# Patient Record
Sex: FEMALE | Race: WHITE | NOT HISPANIC OR LATINO | Employment: UNEMPLOYED | URBAN - NONMETROPOLITAN AREA
[De-identification: names, ages, dates, MRNs, and addresses within clinical notes are randomized per-mention and may not be internally consistent; named-entity substitution may affect disease eponyms.]

---

## 2022-10-01 ENCOUNTER — HOSPITAL ENCOUNTER (INPATIENT)
Facility: HOSPITAL | Age: 32
LOS: 1 days | Discharge: HOME | DRG: 263 | End: 2022-10-03
Attending: EMERGENCY MEDICINE | Admitting: FAMILY MEDICINE
Payer: COMMERCIAL

## 2022-10-01 DIAGNOSIS — K81.0 ACUTE CHOLECYSTITIS: ICD-10-CM

## 2022-10-01 DIAGNOSIS — R10.9 ACUTE ABDOMINAL PAIN: Primary | ICD-10-CM

## 2022-10-01 DIAGNOSIS — K80.00 CALCULUS OF GALLBLADDER WITH ACUTE CHOLECYSTITIS WITHOUT OBSTRUCTION: ICD-10-CM

## 2022-10-01 LAB
BASOPHILS # BLD AUTO: 0.05 THOUSANDS/ΜL (ref 0–0.1)
BASOPHILS NFR BLD AUTO: 1 % (ref 0–1)
D DIMER PPP FEU-MCNC: 0.54 UG/ML FEU
EOSINOPHIL # BLD AUTO: 0.02 THOUSAND/ΜL (ref 0–0.61)
EOSINOPHIL NFR BLD AUTO: 0 % (ref 0–6)
ERYTHROCYTE [DISTWIDTH] IN BLOOD BY AUTOMATED COUNT: 13.3 % (ref 11.6–15.1)
HCT VFR BLD AUTO: 41.3 % (ref 34.8–46.1)
HGB BLD-MCNC: 13.3 G/DL (ref 11.5–15.4)
IMM GRANULOCYTES # BLD AUTO: 0.04 THOUSAND/UL (ref 0–0.2)
IMM GRANULOCYTES NFR BLD AUTO: 0 % (ref 0–2)
LYMPHOCYTES # BLD AUTO: 2.44 THOUSANDS/ΜL (ref 0.6–4.47)
LYMPHOCYTES NFR BLD AUTO: 26 % (ref 14–44)
MCH RBC QN AUTO: 27.8 PG (ref 26.8–34.3)
MCHC RBC AUTO-ENTMCNC: 32.2 G/DL (ref 31.4–37.4)
MCV RBC AUTO: 86 FL (ref 82–98)
MONOCYTES # BLD AUTO: 0.62 THOUSAND/ΜL (ref 0.17–1.22)
MONOCYTES NFR BLD AUTO: 7 % (ref 4–12)
NEUTROPHILS # BLD AUTO: 6.07 THOUSANDS/ΜL (ref 1.85–7.62)
NEUTS SEG NFR BLD AUTO: 66 % (ref 43–75)
NRBC BLD AUTO-RTO: 0 /100 WBCS
PLATELET # BLD AUTO: 281 THOUSANDS/UL (ref 149–390)
PMV BLD AUTO: 10.2 FL (ref 8.9–12.7)
RBC # BLD AUTO: 4.78 MILLION/UL (ref 3.81–5.12)
WBC # BLD AUTO: 9.24 THOUSAND/UL (ref 4.31–10.16)

## 2022-10-01 PROCEDURE — 96375 TX/PRO/DX INJ NEW DRUG ADDON: CPT

## 2022-10-01 PROCEDURE — 85379 FIBRIN DEGRADATION QUANT: CPT | Performed by: PHYSICIAN ASSISTANT

## 2022-10-01 PROCEDURE — 36415 COLL VENOUS BLD VENIPUNCTURE: CPT | Performed by: PHYSICIAN ASSISTANT

## 2022-10-01 PROCEDURE — 96374 THER/PROPH/DIAG INJ IV PUSH: CPT

## 2022-10-01 PROCEDURE — 93005 ELECTROCARDIOGRAM TRACING: CPT

## 2022-10-01 PROCEDURE — 83690 ASSAY OF LIPASE: CPT | Performed by: PHYSICIAN ASSISTANT

## 2022-10-01 PROCEDURE — 85025 COMPLETE CBC W/AUTO DIFF WBC: CPT | Performed by: PHYSICIAN ASSISTANT

## 2022-10-01 PROCEDURE — 99285 EMERGENCY DEPT VISIT HI MDM: CPT

## 2022-10-01 PROCEDURE — 80053 COMPREHEN METABOLIC PANEL: CPT | Performed by: PHYSICIAN ASSISTANT

## 2022-10-01 PROCEDURE — 84484 ASSAY OF TROPONIN QUANT: CPT | Performed by: PHYSICIAN ASSISTANT

## 2022-10-01 PROCEDURE — 99284 EMERGENCY DEPT VISIT MOD MDM: CPT | Performed by: PHYSICIAN ASSISTANT

## 2022-10-01 RX ORDER — OMEPRAZOLE 40 MG/1
40 CAPSULE, DELAYED RELEASE ORAL DAILY
COMMUNITY

## 2022-10-01 RX ORDER — ALBUTEROL SULFATE 90 UG/1
2 AEROSOL, METERED RESPIRATORY (INHALATION) EVERY 6 HOURS PRN
COMMUNITY

## 2022-10-01 RX ORDER — KETOROLAC TROMETHAMINE 30 MG/ML
15 INJECTION, SOLUTION INTRAMUSCULAR; INTRAVENOUS ONCE
Status: COMPLETED | OUTPATIENT
Start: 2022-10-01 | End: 2022-10-01

## 2022-10-01 RX ORDER — SUMATRIPTAN 50 MG/1
50 TABLET, FILM COATED ORAL ONCE AS NEEDED
COMMUNITY

## 2022-10-01 RX ORDER — LORAZEPAM 2 MG/ML
1 INJECTION INTRAMUSCULAR ONCE
Status: COMPLETED | OUTPATIENT
Start: 2022-10-01 | End: 2022-10-01

## 2022-10-01 RX ORDER — ONDANSETRON 2 MG/ML
4 INJECTION INTRAMUSCULAR; INTRAVENOUS ONCE
Status: COMPLETED | OUTPATIENT
Start: 2022-10-01 | End: 2022-10-01

## 2022-10-01 RX ADMIN — KETOROLAC TROMETHAMINE 15 MG: 30 INJECTION, SOLUTION INTRAMUSCULAR at 23:33

## 2022-10-01 RX ADMIN — LORAZEPAM 1 MG: 2 INJECTION, SOLUTION INTRAMUSCULAR; INTRAVENOUS at 23:32

## 2022-10-01 RX ADMIN — ONDANSETRON 4 MG: 2 INJECTION INTRAMUSCULAR; INTRAVENOUS at 23:29

## 2022-10-02 ENCOUNTER — ANESTHESIA EVENT (INPATIENT)
Dept: PERIOP | Facility: HOSPITAL | Age: 32
DRG: 263 | End: 2022-10-02
Payer: COMMERCIAL

## 2022-10-02 ENCOUNTER — APPOINTMENT (EMERGENCY)
Dept: CT IMAGING | Facility: HOSPITAL | Age: 32
DRG: 263 | End: 2022-10-02
Payer: COMMERCIAL

## 2022-10-02 ENCOUNTER — ANESTHESIA (INPATIENT)
Dept: PERIOP | Facility: HOSPITAL | Age: 32
DRG: 263 | End: 2022-10-02
Payer: COMMERCIAL

## 2022-10-02 ENCOUNTER — APPOINTMENT (OUTPATIENT)
Dept: ULTRASOUND IMAGING | Facility: HOSPITAL | Age: 32
DRG: 263 | End: 2022-10-02
Payer: COMMERCIAL

## 2022-10-02 PROBLEM — E66.01 MORBID OBESITY WITH BMI OF 45.0-49.9, ADULT (HCC): Status: ACTIVE | Noted: 2022-10-02

## 2022-10-02 PROBLEM — Z72.0 TOBACCO ABUSE: Status: ACTIVE | Noted: 2022-10-02

## 2022-10-02 PROBLEM — K80.00 CALCULUS OF GALLBLADDER WITH ACUTE CHOLECYSTITIS WITHOUT OBSTRUCTION: Status: ACTIVE | Noted: 2022-10-02

## 2022-10-02 LAB
ALBUMIN SERPL BCP-MCNC: 3.7 G/DL (ref 3.5–5)
ALP SERPL-CCNC: 88 U/L (ref 46–116)
ALT SERPL W P-5'-P-CCNC: 24 U/L (ref 12–78)
ANION GAP SERPL CALCULATED.3IONS-SCNC: 7 MMOL/L (ref 4–13)
APTT PPP: 28 SECONDS (ref 23–37)
AST SERPL W P-5'-P-CCNC: 16 U/L (ref 5–45)
ATRIAL RATE: 58 BPM
BILIRUB SERPL-MCNC: 0.27 MG/DL (ref 0.2–1)
BUN SERPL-MCNC: 17 MG/DL (ref 5–25)
CALCIUM SERPL-MCNC: 8.8 MG/DL (ref 8.3–10.1)
CARDIAC TROPONIN I PNL SERPL HS: <2 NG/L
CHLORIDE SERPL-SCNC: 103 MMOL/L (ref 96–108)
CO2 SERPL-SCNC: 28 MMOL/L (ref 21–32)
CREAT SERPL-MCNC: 1.14 MG/DL (ref 0.6–1.3)
EXT PREG TEST URINE: NEGATIVE
EXT. CONTROL ED NAV: NORMAL
GFR SERPL CREATININE-BSD FRML MDRD: 63 ML/MIN/1.73SQ M
GLUCOSE SERPL-MCNC: 112 MG/DL (ref 65–140)
INR PPP: 1.02 (ref 0.84–1.19)
LIPASE SERPL-CCNC: 170 U/L (ref 73–393)
P AXIS: 42 DEGREES
POTASSIUM SERPL-SCNC: 4 MMOL/L (ref 3.5–5.3)
PR INTERVAL: 168 MS
PROT SERPL-MCNC: 7.6 G/DL (ref 6.4–8.4)
PROTHROMBIN TIME: 13.5 SECONDS (ref 11.6–14.5)
QRS AXIS: 70 DEGREES
QRSD INTERVAL: 82 MS
QT INTERVAL: 442 MS
QTC INTERVAL: 433 MS
SODIUM SERPL-SCNC: 138 MMOL/L (ref 135–147)
T WAVE AXIS: 56 DEGREES
VENTRICULAR RATE: 58 BPM

## 2022-10-02 PROCEDURE — 88304 TISSUE EXAM BY PATHOLOGIST: CPT | Performed by: PATHOLOGY

## 2022-10-02 PROCEDURE — G1004 CDSM NDSC: HCPCS

## 2022-10-02 PROCEDURE — 81025 URINE PREGNANCY TEST: CPT | Performed by: EMERGENCY MEDICINE

## 2022-10-02 PROCEDURE — 85610 PROTHROMBIN TIME: CPT | Performed by: NURSE PRACTITIONER

## 2022-10-02 PROCEDURE — 85730 THROMBOPLASTIN TIME PARTIAL: CPT | Performed by: NURSE PRACTITIONER

## 2022-10-02 PROCEDURE — 47562 LAPAROSCOPIC CHOLECYSTECTOMY: CPT | Performed by: PHYSICIAN ASSISTANT

## 2022-10-02 PROCEDURE — 36415 COLL VENOUS BLD VENIPUNCTURE: CPT | Performed by: NURSE PRACTITIONER

## 2022-10-02 PROCEDURE — 99222 1ST HOSP IP/OBS MODERATE 55: CPT | Performed by: FAMILY MEDICINE

## 2022-10-02 PROCEDURE — 0FT44ZZ RESECTION OF GALLBLADDER, PERCUTANEOUS ENDOSCOPIC APPROACH: ICD-10-PCS | Performed by: SURGERY

## 2022-10-02 PROCEDURE — C9113 INJ PANTOPRAZOLE SODIUM, VIA: HCPCS | Performed by: NURSE PRACTITIONER

## 2022-10-02 PROCEDURE — 71275 CT ANGIOGRAPHY CHEST: CPT

## 2022-10-02 PROCEDURE — 74177 CT ABD & PELVIS W/CONTRAST: CPT

## 2022-10-02 PROCEDURE — 76705 ECHO EXAM OF ABDOMEN: CPT

## 2022-10-02 RX ORDER — ACETAMINOPHEN 325 MG/1
650 TABLET ORAL EVERY 6 HOURS PRN
Status: DISCONTINUED | OUTPATIENT
Start: 2022-10-02 | End: 2022-10-03 | Stop reason: HOSPADM

## 2022-10-02 RX ORDER — SODIUM CHLORIDE 9 MG/ML
100 INJECTION, SOLUTION INTRAVENOUS CONTINUOUS
Status: DISCONTINUED | OUTPATIENT
Start: 2022-10-02 | End: 2022-10-03

## 2022-10-02 RX ORDER — HYDROMORPHONE HCL IN WATER/PF 6 MG/30 ML
0.2 PATIENT CONTROLLED ANALGESIA SYRINGE INTRAVENOUS
Status: DISCONTINUED | OUTPATIENT
Start: 2022-10-02 | End: 2022-10-02

## 2022-10-02 RX ORDER — ALBUTEROL SULFATE 90 UG/1
2 AEROSOL, METERED RESPIRATORY (INHALATION) EVERY 6 HOURS PRN
Status: DISCONTINUED | OUTPATIENT
Start: 2022-10-02 | End: 2022-10-03 | Stop reason: HOSPADM

## 2022-10-02 RX ORDER — ROCURONIUM BROMIDE 10 MG/ML
INJECTION, SOLUTION INTRAVENOUS AS NEEDED
Status: DISCONTINUED | OUTPATIENT
Start: 2022-10-02 | End: 2022-10-02

## 2022-10-02 RX ORDER — PANTOPRAZOLE SODIUM 40 MG/10ML
40 INJECTION, POWDER, LYOPHILIZED, FOR SOLUTION INTRAVENOUS
Status: DISCONTINUED | OUTPATIENT
Start: 2022-10-02 | End: 2022-10-03 | Stop reason: HOSPADM

## 2022-10-02 RX ORDER — HYDROMORPHONE HCL IN WATER/PF 6 MG/30 ML
0.2 PATIENT CONTROLLED ANALGESIA SYRINGE INTRAVENOUS
Status: DISCONTINUED | OUTPATIENT
Start: 2022-10-02 | End: 2022-10-03 | Stop reason: HOSPADM

## 2022-10-02 RX ORDER — DEXAMETHASONE SODIUM PHOSPHATE 10 MG/ML
INJECTION, SOLUTION INTRAMUSCULAR; INTRAVENOUS AS NEEDED
Status: DISCONTINUED | OUTPATIENT
Start: 2022-10-02 | End: 2022-10-02

## 2022-10-02 RX ORDER — DIPHENHYDRAMINE HYDROCHLORIDE 50 MG/ML
12.5 INJECTION INTRAMUSCULAR; INTRAVENOUS ONCE AS NEEDED
Status: DISCONTINUED | OUTPATIENT
Start: 2022-10-02 | End: 2022-10-02 | Stop reason: HOSPADM

## 2022-10-02 RX ORDER — PROPOFOL 10 MG/ML
INJECTION, EMULSION INTRAVENOUS AS NEEDED
Status: DISCONTINUED | OUTPATIENT
Start: 2022-10-02 | End: 2022-10-02

## 2022-10-02 RX ORDER — KETOROLAC TROMETHAMINE 30 MG/ML
INJECTION, SOLUTION INTRAMUSCULAR; INTRAVENOUS AS NEEDED
Status: DISCONTINUED | OUTPATIENT
Start: 2022-10-02 | End: 2022-10-02

## 2022-10-02 RX ORDER — HYDROMORPHONE HCL/PF 1 MG/ML
0.25 SYRINGE (ML) INJECTION
Status: DISCONTINUED | OUTPATIENT
Start: 2022-10-02 | End: 2022-10-02 | Stop reason: HOSPADM

## 2022-10-02 RX ORDER — LIDOCAINE HYDROCHLORIDE 10 MG/ML
INJECTION, SOLUTION EPIDURAL; INFILTRATION; INTRACAUDAL; PERINEURAL AS NEEDED
Status: DISCONTINUED | OUTPATIENT
Start: 2022-10-02 | End: 2022-10-02

## 2022-10-02 RX ORDER — MIDAZOLAM HYDROCHLORIDE 2 MG/2ML
INJECTION, SOLUTION INTRAMUSCULAR; INTRAVENOUS AS NEEDED
Status: DISCONTINUED | OUTPATIENT
Start: 2022-10-02 | End: 2022-10-02

## 2022-10-02 RX ORDER — PROPOFOL 10 MG/ML
INJECTION, EMULSION INTRAVENOUS CONTINUOUS PRN
Status: DISCONTINUED | OUTPATIENT
Start: 2022-10-02 | End: 2022-10-02

## 2022-10-02 RX ORDER — DEXMEDETOMIDINE HYDROCHLORIDE 100 UG/ML
INJECTION, SOLUTION INTRAVENOUS AS NEEDED
Status: DISCONTINUED | OUTPATIENT
Start: 2022-10-02 | End: 2022-10-02

## 2022-10-02 RX ORDER — CEFAZOLIN SODIUM 1 G/3ML
INJECTION, POWDER, FOR SOLUTION INTRAMUSCULAR; INTRAVENOUS AS NEEDED
Status: DISCONTINUED | OUTPATIENT
Start: 2022-10-02 | End: 2022-10-02

## 2022-10-02 RX ORDER — LIDOCAINE HYDROCHLORIDE AND EPINEPHRINE 10; 10 MG/ML; UG/ML
INJECTION, SOLUTION INFILTRATION; PERINEURAL AS NEEDED
Status: DISCONTINUED | OUTPATIENT
Start: 2022-10-02 | End: 2022-10-02 | Stop reason: HOSPADM

## 2022-10-02 RX ORDER — MAGNESIUM HYDROXIDE 1200 MG/15ML
LIQUID ORAL AS NEEDED
Status: DISCONTINUED | OUTPATIENT
Start: 2022-10-02 | End: 2022-10-02 | Stop reason: HOSPADM

## 2022-10-02 RX ORDER — OXYCODONE HYDROCHLORIDE AND ACETAMINOPHEN 5; 325 MG/1; MG/1
1 TABLET ORAL EVERY 4 HOURS PRN
Status: DISCONTINUED | OUTPATIENT
Start: 2022-10-02 | End: 2022-10-03 | Stop reason: HOSPADM

## 2022-10-02 RX ORDER — ONDANSETRON 2 MG/ML
4 INJECTION INTRAMUSCULAR; INTRAVENOUS ONCE AS NEEDED
Status: DISCONTINUED | OUTPATIENT
Start: 2022-10-02 | End: 2022-10-02 | Stop reason: HOSPADM

## 2022-10-02 RX ORDER — HEPARIN SODIUM 5000 [USP'U]/ML
5000 INJECTION, SOLUTION INTRAVENOUS; SUBCUTANEOUS EVERY 8 HOURS SCHEDULED
Status: DISCONTINUED | OUTPATIENT
Start: 2022-10-02 | End: 2022-10-03 | Stop reason: HOSPADM

## 2022-10-02 RX ORDER — SODIUM CHLORIDE, SODIUM LACTATE, POTASSIUM CHLORIDE, CALCIUM CHLORIDE 600; 310; 30; 20 MG/100ML; MG/100ML; MG/100ML; MG/100ML
INJECTION, SOLUTION INTRAVENOUS CONTINUOUS PRN
Status: DISCONTINUED | OUTPATIENT
Start: 2022-10-02 | End: 2022-10-02

## 2022-10-02 RX ORDER — ONDANSETRON 2 MG/ML
4 INJECTION INTRAMUSCULAR; INTRAVENOUS EVERY 6 HOURS PRN
Status: DISCONTINUED | OUTPATIENT
Start: 2022-10-02 | End: 2022-10-03 | Stop reason: HOSPADM

## 2022-10-02 RX ORDER — FENTANYL CITRATE/PF 50 MCG/ML
25 SYRINGE (ML) INJECTION
Status: DISCONTINUED | OUTPATIENT
Start: 2022-10-02 | End: 2022-10-02 | Stop reason: HOSPADM

## 2022-10-02 RX ORDER — FENTANYL CITRATE 50 UG/ML
INJECTION, SOLUTION INTRAMUSCULAR; INTRAVENOUS AS NEEDED
Status: DISCONTINUED | OUTPATIENT
Start: 2022-10-02 | End: 2022-10-02

## 2022-10-02 RX ADMIN — FENTANYL CITRATE 100 MCG: 0.05 INJECTION, SOLUTION INTRAMUSCULAR; INTRAVENOUS at 10:00

## 2022-10-02 RX ADMIN — PANTOPRAZOLE SODIUM 40 MG: 40 INJECTION, POWDER, FOR SOLUTION INTRAVENOUS at 01:42

## 2022-10-02 RX ADMIN — HEPARIN SODIUM 5000 UNITS: 5000 INJECTION INTRAVENOUS; SUBCUTANEOUS at 21:02

## 2022-10-02 RX ADMIN — MIDAZOLAM 2 MG: 1 INJECTION INTRAMUSCULAR; INTRAVENOUS at 09:52

## 2022-10-02 RX ADMIN — OXYCODONE HYDROCHLORIDE AND ACETAMINOPHEN 1 TABLET: 5; 325 TABLET ORAL at 21:02

## 2022-10-02 RX ADMIN — DEXMEDETOMIDINE HCL 8 MCG: 100 INJECTION INTRAVENOUS at 10:34

## 2022-10-02 RX ADMIN — DEXMEDETOMIDINE HCL 8 MCG: 100 INJECTION INTRAVENOUS at 10:26

## 2022-10-02 RX ADMIN — FENTANYL CITRATE 25 MCG: 0.05 INJECTION, SOLUTION INTRAMUSCULAR; INTRAVENOUS at 11:42

## 2022-10-02 RX ADMIN — PIPERACILLIN AND TAZOBACTAM 4.5 G: 4; .5 INJECTION, POWDER, LYOPHILIZED, FOR SOLUTION INTRAVENOUS at 13:18

## 2022-10-02 RX ADMIN — IOHEXOL 99 ML: 350 INJECTION, SOLUTION INTRAVENOUS at 00:40

## 2022-10-02 RX ADMIN — DEXMEDETOMIDINE HCL 8 MCG: 100 INJECTION INTRAVENOUS at 09:52

## 2022-10-02 RX ADMIN — OXYCODONE HYDROCHLORIDE AND ACETAMINOPHEN 1 TABLET: 5; 325 TABLET ORAL at 13:32

## 2022-10-02 RX ADMIN — SODIUM CHLORIDE, SODIUM LACTATE, POTASSIUM CHLORIDE, AND CALCIUM CHLORIDE: .6; .31; .03; .02 INJECTION, SOLUTION INTRAVENOUS at 10:59

## 2022-10-02 RX ADMIN — PROPOFOL 100 MG: 10 INJECTION, EMULSION INTRAVENOUS at 10:01

## 2022-10-02 RX ADMIN — SODIUM CHLORIDE 3.38 G: 9 INJECTION, SOLUTION INTRAVENOUS at 02:39

## 2022-10-02 RX ADMIN — DEXMEDETOMIDINE HCL 8 MCG: 100 INJECTION INTRAVENOUS at 10:50

## 2022-10-02 RX ADMIN — PIPERACILLIN AND TAZOBACTAM 4.5 G: 4; .5 INJECTION, POWDER, LYOPHILIZED, FOR SOLUTION INTRAVENOUS at 07:32

## 2022-10-02 RX ADMIN — LIDOCAINE HYDROCHLORIDE 50 MG: 10 INJECTION, SOLUTION EPIDURAL; INFILTRATION; INTRACAUDAL; PERINEURAL at 10:00

## 2022-10-02 RX ADMIN — SODIUM CHLORIDE 100 ML/HR: 0.9 INJECTION, SOLUTION INTRAVENOUS at 13:19

## 2022-10-02 RX ADMIN — ROCURONIUM BROMIDE 50 MG: 10 INJECTION, SOLUTION INTRAVENOUS at 10:01

## 2022-10-02 RX ADMIN — ROCURONIUM BROMIDE 20 MG: 10 INJECTION, SOLUTION INTRAVENOUS at 10:37

## 2022-10-02 RX ADMIN — SUGAMMADEX 200 MG: 100 INJECTION, SOLUTION INTRAVENOUS at 11:15

## 2022-10-02 RX ADMIN — KETOROLAC TROMETHAMINE 30 MG: 30 INJECTION, SOLUTION INTRAMUSCULAR at 11:09

## 2022-10-02 RX ADMIN — FENTANYL CITRATE 25 MCG: 0.05 INJECTION, SOLUTION INTRAMUSCULAR; INTRAVENOUS at 11:47

## 2022-10-02 RX ADMIN — SUGAMMADEX 100 MG: 100 INJECTION, SOLUTION INTRAVENOUS at 11:20

## 2022-10-02 RX ADMIN — DEXAMETHASONE SODIUM PHOSPHATE 10 MG: 10 INJECTION INTRAMUSCULAR; INTRAVENOUS at 10:03

## 2022-10-02 RX ADMIN — DEXMEDETOMIDINE HCL 8 MCG: 100 INJECTION INTRAVENOUS at 10:22

## 2022-10-02 RX ADMIN — SODIUM CHLORIDE 100 ML/HR: 0.9 INJECTION, SOLUTION INTRAVENOUS at 01:41

## 2022-10-02 RX ADMIN — SODIUM CHLORIDE, SODIUM LACTATE, POTASSIUM CHLORIDE, AND CALCIUM CHLORIDE: .6; .31; .03; .02 INJECTION, SOLUTION INTRAVENOUS at 09:55

## 2022-10-02 RX ADMIN — PROPOFOL 150 MCG/KG/MIN: 10 INJECTION, EMULSION INTRAVENOUS at 10:01

## 2022-10-02 RX ADMIN — ONDANSETRON 4 MG: 2 INJECTION INTRAMUSCULAR; INTRAVENOUS at 10:01

## 2022-10-02 RX ADMIN — CEFAZOLIN 3000 MG: 1 INJECTION, POWDER, FOR SOLUTION INTRAMUSCULAR; INTRAVENOUS at 10:15

## 2022-10-02 RX ADMIN — HYDROMORPHONE HYDROCHLORIDE 0.25 MG: 1 INJECTION, SOLUTION INTRAMUSCULAR; INTRAVENOUS; SUBCUTANEOUS at 11:52

## 2022-10-02 RX ADMIN — HYDROMORPHONE HYDROCHLORIDE 0.25 MG: 1 INJECTION, SOLUTION INTRAMUSCULAR; INTRAVENOUS; SUBCUTANEOUS at 12:02

## 2022-10-02 RX ADMIN — PIPERACILLIN AND TAZOBACTAM 4.5 G: 4; .5 INJECTION, POWDER, LYOPHILIZED, FOR SOLUTION INTRAVENOUS at 19:43

## 2022-10-02 NOTE — ED ATTENDING ATTESTATION
10/1/2022  Hilaria Fletcher DO, saw and evaluated the patient  I have discussed the patient with the resident/non-physician practitioner and agree with the resident's/non-physician practitioner's findings, Plan of Care, and MDM as documented in the resident's/non-physician practitioner's note, except where noted  All available labs and Radiology studies were reviewed  I was present for key portions of any procedure(s) performed by the resident/non-physician practitioner and I was immediately available to provide assistance  At this point I agree with the current assessment done in the Emergency Department  I have conducted an independent evaluation of this patient a history and physical is as follows:    ED Course     28-year-old female with a history of hypercoagulability disorders as documented in the chart presenting to the emergency room with chief complaint of central back pain with radiation around to her right upper quadrant  Seen evaluated with the physician's assistant  This pain has been intermittent since 20/11  Recently worse  Agree with the physician's assistant's exam   Exam is fairly benign at this point  I agree with the workup  Will review labs and disposition as per the results of those studies

## 2022-10-02 NOTE — ANESTHESIA POSTPROCEDURE EVALUATION
Post-Op Assessment Note    CV Status:  Stable  Pain Score: 0    Pain management: adequate  Multimodal analgesia used between 6 hours prior to anesthesia start to PACU discharge    Mental Status:  Awake and sleepy (tearful, reassurance provided)   Hydration Status:  Euvolemic   PONV Controlled:  Controlled   Airway Patency:  Patent   Two or more mitigation strategies used for obstructive sleep apnea   Post Op Vitals Reviewed: Yes      Staff: CRNA         No complications documented      /65 (10/02/22 1137)    Temp 97 5 °F (36 4 °C) (10/02/22 1137)    Pulse 71 (10/02/22 1137)   Resp 18 (10/02/22 1137)    SpO2 97 % (10/02/22 1137)

## 2022-10-02 NOTE — UTILIZATION REVIEW
Initial Clinical Review    Admission: Date/Time/Statement:   Admission Orders (From admission, onward)     Ordered        10/02/22 0123  INPATIENT ADMISSION  Once                      Orders Placed This Encounter   Procedures    INPATIENT ADMISSION     Standing Status:   Standing     Number of Occurrences:   1     Order Specific Question:   Level of Care     Answer:   Med Surg [16]     Order Specific Question:   Estimated length of stay     Answer:   More than 2 Midnights     Order Specific Question:   Certification     Answer:   I certify that inpatient services are medically necessary for this patient for a duration of greater than two midnights  See H&P and MD Progress Notes for additional information about the patient's course of treatment  ED Arrival Information     Expected   -    Arrival   10/1/2022 22:22    Acuity   Urgent            Means of arrival   Walk-In    Escorted by   [facility]    Admission type   Emergency            Arrival complaint   back pain           Chief Complaint   Patient presents with    Back Pain     Mid back pain x several years  Pt states today the pain makes it harder to breathe  Initial Presentation: 28 y o  female   To ER from home  PMH of factor 5 Leiden, obesity, asthma, tobacco abuse, anxiety who presents with 10/10 severe epigastric abdominal pain radiating through to back with nausea:  States similar previous episodes but those were non sustained  Today so acute can hardly take a breath  IN ER,  CTAP w/cholelithiasis in gallbladder neck with associated cholecystitis  BMI 47   EXAM:  abd distended, tender gabrielle in RUQ w/guarding and POS Willson's sign   Will  Admit IP status,  MS Level of care for management of  Acute Cholecystitis  Cholelithiasis  Will ck RUQ US,  Keep NPO,  Provide IVF,  IV pain/nausea control  Initiate Empiric IV zosyn    Consult Surgery  (saw on 10/02 am and took pt to OR)      Date:    10/02      Day 2:      OP NOTE:    LAP CHOLECYSTECTOMY  Under general anesthesia  FINDINGS:    Thick-walled edematous gallbladder with large at least 2 cm stone in the neck of the gallbladder  To MS Level of care for post op care             ED Triage Vitals   Temperature Pulse Respirations Blood Pressure SpO2   10/01/22 2235 10/01/22 2235 10/01/22 2235 10/01/22 2235 10/01/22 2235   98 1 °F (36 7 °C) 63 18 139/91 99 %      Temp Source Heart Rate Source Patient Position - Orthostatic VS BP Location FiO2 (%)   10/01/22 2235 10/01/22 2235 10/01/22 2235 10/01/22 2235 --   Oral Monitor Sitting Right arm       Pain Score       10/01/22 2333       4          Wt Readings from Last 1 Encounters:   10/01/22 132 kg (292 lb)     Additional Vital Signs:   10/02/22 15:37:06 97 9 °F (36 6 °C) 67 17 123/76 92 95 %         10/02/22 1147 -- 63 20 149/81 -- 90 % None (Room air) -- --   10/02/22 1142 -- 81 20 149/91 -- 98 % None (Room air) -- --   10/02/22 1137 97 5 °F (36 4 °C) 71 18 137/65 -- 97 % None (Room air) WDL --   10/02/22 0515 -- 63 -- 94/54 69 95 % -- -- --   10/02/22 0500 -- 60 16 100/55 72 95 % -- -- --   10/02/22 0445 -- 61 16 97/52 68 96 % -- -- --   10/02/22 0415 -- 66 16 94/51 66 95 % -- -- --   10/02/22 0330 -- 61 -- 104/55 74 95 % -- -- --   10/02/22 0015 -- 70 -- 116/69 87 95 % -- -- --   10/01/22 2345 -- 67 -- 106/59 78 97 % --           Pertinent Labs/Diagnostic Test Results:   · 10/01  EKG: NSR  HR No ST elevation  US right upper quadrant   Final Result by Radha Hooper MD (10/02 9773)      Findings suggestive of acute cholecystitis  The study was marked in [facility] for immediate notification  Workstation performed: [id number]         PE Study with CT abdomen & pelvis with contrast   Final Result by Doni Light MD (10/02 0110)      There are gallstones and a gallstone is seen in the gallbladder neck  There is pericholecystic fluid  Findings suggests acute cholecystitis  Recommend clinical correlation  Low-lying IUD  Recommend repositioning  The study was marked in [facility] for immediate notification        Workstation performed: [id number]               Results from last 7 days   Lab Units 10/01/22  2325   WBC Thousand/uL 9 24   HEMOGLOBIN g/dL 13 3   HEMATOCRIT % 41 3   PLATELETS Thousands/uL 281   NEUTROS ABS Thousands/µL 6 07         Results from last 7 days   Lab Units 10/01/22  2325   SODIUM mmol/L 138   POTASSIUM mmol/L 4 0   CHLORIDE mmol/L 103   CO2 mmol/L 28   ANION GAP mmol/L 7   BUN mg/dL 17   CREATININE mg/dL 1 14   EGFR ml/min/1 73sq m 63   CALCIUM mg/dL 8 8     Results from last 7 days   Lab Units 10/01/22  2325   AST U/L 16   ALT U/L 24   ALK PHOS U/L 88   TOTAL PROTEIN g/dL 7 6   ALBUMIN g/dL 3 7   TOTAL BILIRUBIN mg/dL 0 27         Results from last 7 days   Lab Units 10/01/22  2325   GLUCOSE RANDOM mg/dL 112       Results from last 7 days   Lab Units 10/01/22  2325   HS TNI 0HR ng/L <2     Results from last 7 days   Lab Units 10/01/22  2325   D-DIMER QUANTITATIVE ug/ml FEU 0 54*     Results from last 7 days   Lab Units 10/02/22  0732   PROTIME seconds 13 5   INR  1 02   PTT seconds 28       Results from last 7 days   Lab Units 10/01/22  2325   LIPASE u/L 170            ED Treatment:   Medication Administration from 10/01/2022 2219 to 10/02/2022 0931       Date/Time Order Dose Route Action     10/01/2022 2333 ketorolac (TORADOL) injection 15 mg 15 mg Intravenous Given     10/01/2022 2329 ondansetron (ZOFRAN) injection 4 mg 4 mg Intravenous Given     10/01/2022 2332 LORazepam (ATIVAN) injection 1 mg 1 mg Intravenous Given     10/02/2022 0142 pantoprazole (PROTONIX) injection 40 mg 40 mg Intravenous Given     10/02/2022 0141 sodium chloride 0 9 % infusion 100 mL/hr Intravenous New Bag     10/02/2022 0239 piperacillin-tazobactam (ZOSYN) 3 375 g in sodium chloride 0 9 % 100 mL IVPB 3 375 g Intravenous New Bag     10/02/2022 0732 piperacillin-tazobactam (ZOSYN) 4 5 g in sodium chloride 0 9 % 100 mL IVPB 4 5 g Intravenous New Bag        Past Medical History:   Diagnosis Date    Asthma     Clotting disorder ([facility] [address] )      Present on Admission:   Calculus of gallbladder with acute cholecystitis without obstruction   Tobacco abuse      Admitting Diagnosis: Acute cholecystitis [K81 0]  Acute abdominal pain [R10 9]  Age/Sex: 28 y o  female  Admission Orders:   See note above  SCD's,  accucks qid w/SSI  IVF LR @  100     Scheduled Medications:  cefazolin, 3,000 mg, Intravenous, Once  pantoprazole, 40 mg, Intravenous, Q24H ESTHER   piperacillin-tazobactam, 4 5 g, Intravenous, Q6H      Continuous IV Infusions:  sodium chloride, 100 mL/hr, Intravenous, Continuous      PRN Meds:  acetaminophen, 650 mg, Oral, Q6H PRN  [MAR Hold] albuterol, 2 puff, Inhalation, Q6H PRN  diphenhydrAMINE, 12 5 mg, Intravenous, Once PRN  fentaNYL, 25 mcg, Intravenous, Q5 Min PRN  HYDROmorphone, 0 25 mg, Intravenous, Q10 Min PRN  [MAR Hold] HYDROmorphone, 0 2 mg, Intravenous, Q3H PRN  [MAR Hold] ondansetron, 4 mg, Intravenous, Q6H PRN  ondansetron, 4 mg, Intravenous, Once PRN  oxyCODONE-acetaminophen, 1 tablet, Oral, Q4H PRN        IP CONSULT TO ACUTE CARE SURGERY  IP CONSULT TO ACUTE CARE SURGERY    Network Utilization Review Department  ATTENTION: Please call with any questions or concerns to [phone] and carefully listen to the prompts so that you are directed to the right person  All voicemails are confidential   Sudie Crigler all requests for admission clinical reviews, approved or denied determinations and any other requests to dedicated fax number below belonging to the campus where the patient is receiving treatment   List of dedicated fax numbers for the Facilities:  [address] DENIALS (Administrative/Medical Necessity) [phone]   [address] [place] [address] (Maternity/NICU/Pediatrics) 261 [facility],[address] Floor [place] [address] [phone]   Jomar Lubin [address] [facility] [address] [address] [place] [address] [address] [place] [address] [phone]

## 2022-10-02 NOTE — CONSULTS
Consultation - General Surgery   Jerardo Miller 28 y o  female MRN: [id number]  Unit/Bed#: ED 04 Encounter: [id number]    Assessment/Plan     Assessment:  Acute cholecystitis cholelithiasis  History of factor 5 Leiden deficiency  History of asthma  History of DVT of the lower extremity 11 years ago    Plan:  I recommend laparoscopic cholecystectomy possible open cholecystectomy for the patient the procedure details were discussed with the patient  Possible complications including bleeding infection blood clot heart attack stroke, open procedure and possible bile duct injury or bile leakage  The patient's questions were answered and she understands and agrees with the plan  History of Present Illness     HPI:  Jerardo Miller is a 28 y o  female who presents with complaint of abdominal pain beginning yesterday and worsened through the day  The patient admits to back pain radiating around to the right upper quadrant  The patient admits to nausea  She states she has had similar attacks for the past 11 years but they went away relatively quickly  This attack was much worse  She does admit to an episode of diarrhea  Denies any fevers  Consults    Review of Systems   Constitutional: Negative  HENT: Negative  Respiratory: Negative  Cardiovascular: Negative  Gastrointestinal: Positive for abdominal pain, diarrhea and nausea  Genitourinary: Negative  Musculoskeletal: Negative  Neurological: Negative  Hematological: Negative  Psychiatric/Behavioral: Negative  Historical Information   Past Medical History:   Diagnosis Date    Asthma     [facility]      History reviewed  No pertinent surgical history    Social History   Social History     Substance and Sexual Activity   Alcohol Use Never     Social History     Substance and Sexual Activity   Drug Use Never     E-Cigarette/Vaping    E-Cigarette Use Current Every Day User      E-Cigarette/Vaping Substances    Nicotine Yes      Social History     Tobacco Use   Smoking Status Never Smoker   Smokeless Tobacco Never Used     Family History: History reviewed  No pertinent family history  Meds/Allergies   current meds:   Current Facility-Administered Medications   Medication Dose Route Frequency    albuterol (PROVENTIL HFA,VENTOLIN HFA) inhaler 2 puff  2 puff Inhalation Q6H PRN    HYDROmorphone HCl (DILAUDID) injection 0 2 mg  0 2 mg Intravenous Q3H PRN    ondansetron (ZOFRAN) injection 4 mg  4 mg Intravenous Q6H PRN    pantoprazole (PROTONIX) injection 40 mg  40 mg Intravenous Q24H ESTHER    piperacillin-tazobactam (ZOSYN) 4 5 g in sodium chloride 0 9 % 100 mL IVPB  4 5 g Intravenous Q6H    sodium chloride 0 9 % infusion  100 mL/hr Intravenous Continuous    and PTA meds:   Prior to Admission Medications   Prescriptions Last Dose Informant Patient Reported? Taking?    SUMAtriptan (Imitrex) 50 mg tablet   Yes Yes   Sig: Take 50 mg by mouth once as needed for migraine   albuterol (PROVENTIL HFA,VENTOLIN HFA) 90 mcg/act inhaler   Yes Yes   Sig: Inhale 2 puffs every 6 (six) hours as needed for wheezing   omeprazole (PriLOSEC) 40 MG capsule   Yes Yes   Sig: Take 40 mg by mouth daily      Facility-Administered Medications: None     No Known Allergies    Objective   First Vitals:   Blood Pressure: 139/91 (10/01/22 2235)  Pulse: 63 (10/01/22 2235)  Temperature: 98 1 °F (36 7 °C) (10/01/22 2235)  Temp Source: Oral (10/01/22 2235)  Respirations: 18 (10/01/22 2235)  Height: 5' 6" (167 6 cm) (10/01/22 2235)  Weight - Scale: 132 kg (292 lb) (10/01/22 2235)  SpO2: 99 % (10/01/22 2235)    Current Vitals:   Blood Pressure: 122/61 (10/02/22 0715)  Pulse: 55 (10/02/22 0715)  Temperature: 98 1 °F (36 7 °C) (10/01/22 2235)  Temp Source: Oral (10/01/22 2235)  Respirations: 18 (10/02/22 0715)  Height: 5' 6" (167 6 cm) (10/01/22 2235)  Weight - Scale: 132 kg (292 lb) (10/01/22 2235)  SpO2: 99 % (10/02/22 0715)      Intake/Output Summary (Last 24 hours) at 10/2/2022 0813  Last data filed at 10/2/2022 0416  Gross per 24 hour   Intake 100 ml   Output --   Net 100 ml       Invasive Devices  Report    Peripheral Intravenous Line  Duration           Peripheral IV 10/01/22 Left Antecubital <1 day                Physical Exam  Constitutional:       Appearance: She is obese  She is ill-appearing  HENT:      Head: Normocephalic and atraumatic  Mouth/Throat:      Mouth: Mucous membranes are moist    Cardiovascular:      Rate and Rhythm: Normal rate and regular rhythm  Pulses: Normal pulses  Heart sounds: Normal heart sounds  Pulmonary:      Effort: Pulmonary effort is normal       Breath sounds: Normal breath sounds  Abdominal:      Palpations: Abdomen is soft  Tenderness: There is abdominal tenderness  There is guarding  Comments: There is right upper quadrant and mid epigastric tenderness to palpation with some guarding noted  Musculoskeletal:         General: Normal range of motion  Skin:     General: Skin is warm and dry  Neurological:      General: No focal deficit present  Mental Status: She is alert and oriented to person, place, and time     Psychiatric:         Mood and Affect: Mood normal          Behavior: Behavior normal          Lab Results:   CBC:   Lab Results   Component Value Date    WBC 9 24 10/01/2022    HGB 13 3 10/01/2022    HCT 41 3 10/01/2022    MCV 86 10/01/2022     10/01/2022    MCH 27 8 10/01/2022    MCHC 32 2 10/01/2022    RDW 13 3 10/01/2022    MPV 10 2 10/01/2022    NRBC 0 10/01/2022   , CMP:   Lab Results   Component Value Date    SODIUM 138 10/01/2022    K 4 0 10/01/2022     10/01/2022    CO2 28 10/01/2022    BUN 17 10/01/2022    CREATININE 1 14 10/01/2022    CALCIUM 8 8 10/01/2022    AST 16 10/01/2022    ALT 24 10/01/2022    ALKPHOS 88 10/01/2022    EGFR 63 10/01/2022   , Lipase:   Lab Results   Component Value Date    LIPASE 170 10/01/2022     Imaging: I have personally reviewed pertinent films in PACS  EKG, Pathology, and Other Studies: I have personally reviewed pertinent reports  Counseling / Coordination of Care  Total floor / unit time spent today 25 minutes  Greater than 50% of total time was spent with the patient and / or family counseling and / or coordination of care  A description of the counseling / coordination of care:  Discussed with patient and nursing the plan for laparoscopic cholecystectomy

## 2022-10-02 NOTE — ED NOTES
Pt ambulated to the bathroom with a steady gait  Denies pain at this time        [address], RN  10/02/22 0937

## 2022-10-02 NOTE — ED PROVIDER NOTES
History  Chief Complaint   Patient presents with    Back Pain     Mid back pain x several years  Pt states today the pain makes it harder to breathe  26-year-old female presents emergency department for evaluation of back pain  Patient states this has been intermittent since 2011 when she had her 1st epidural due to childbirth  Patient states recently pain has been worsening  States she also has pain in the epigastric region radiating to shoulder blades  Patient states pain occasionally radiates into her chest   States she feels occasionally short of breath  Patient states pain occasionally makes it hard to breathe  States she has been using her inhaler without improvement of symptoms  She denies any palpitations or leg swelling  She reports she does have history of factor 5 Leiden and thrombophilia 3  Not on any anticoagulation  Patient states back pain is occasionally improved with passing gas  Reports history of asthma  Patient denies chance of pregnancy and reports having tubes tied  Denies dysuria, hematuria, urinary frequency  She denies fevers or chills  Patient denies any saddle paresthesias, loss of bowel or bladder control, fevers, chills, IV drug use  History provided by:  Patient  Back Pain  Location:  Thoracic spine  Quality:  Shooting  Radiates to:  Does not radiate  Pain severity:  Moderate  Onset quality:  Gradual  Timing:  Intermittent  Chronicity:  Chronic  Associated symptoms: abdominal pain and chest pain    Associated symptoms: no abdominal swelling, no bladder incontinence, no bowel incontinence, no dysuria, no fever, no headaches, no leg pain, no numbness, no paresthesias, no pelvic pain, no perianal numbness, no tingling, no weakness and no weight loss        None       Past Medical History:   Diagnosis Date    Asthma     Clotting disorder ([facility] [address] )        History reviewed  No pertinent surgical history  History reviewed  No pertinent family history    I have reviewed and agree with the history as documented  E-Cigarette/Vaping    E-Cigarette Use Current Every Day User      E-Cigarette/Vaping Substances    Nicotine Yes      Social History     Tobacco Use    Smoking status: Never Smoker    Smokeless tobacco: Never Used   Vaping Use    Vaping Use: Every day    Substances: Nicotine   Substance Use Topics    Alcohol use: Never    Drug use: Never       Review of Systems   Constitutional: Negative for appetite change, chills, diaphoresis, fatigue, fever and weight loss  Respiratory: Positive for shortness of breath  Negative for cough, choking, chest tightness, wheezing and stridor  Cardiovascular: Positive for chest pain  Negative for palpitations and leg swelling  Gastrointestinal: Positive for abdominal pain  Negative for bowel incontinence, nausea and vomiting  Genitourinary: Negative  Negative for bladder incontinence, dysuria and pelvic pain  Musculoskeletal: Positive for back pain  Skin: Negative  Neurological: Negative  Negative for tingling, weakness, numbness, headaches and paresthesias  All other systems reviewed and are negative  Physical Exam  Physical Exam  Vitals and nursing note reviewed  Constitutional:       General: She is not in acute distress  Appearance: Normal appearance  She is obese  She is not ill-appearing, toxic-appearing or diaphoretic  HENT:      Head: Normocephalic  Nose: Nose normal       Mouth/Throat:      Mouth: Mucous membranes are moist       Pharynx: Oropharynx is clear  No oropharyngeal exudate or posterior oropharyngeal erythema  Eyes:      Conjunctiva/sclera: Conjunctivae normal       Pupils: Pupils are equal, round, and reactive to light  Cardiovascular:      Rate and Rhythm: Normal rate and regular rhythm  Pulses:           Posterior tibial pulses are 2+ on the right side and 2+ on the left side  Pulmonary:      Effort: Pulmonary effort is normal  No respiratory distress        Breath sounds: Normal breath sounds  No stridor  No wheezing, rhonchi or rales  Chest:      Chest wall: No tenderness  Abdominal:      General: Abdomen is flat  Bowel sounds are normal       Palpations: Abdomen is soft  Tenderness: There is abdominal tenderness in the right upper quadrant and epigastric area  There is no right CVA tenderness, left CVA tenderness or guarding  Musculoskeletal:         General: No swelling or deformity  Normal range of motion  Cervical back: Normal and normal range of motion  Back:       Comments: Diffuse mid back tenderness  No skin changes -no rashes, bruises, redness or swelling  No specific midline tenderness or step-off deformities  Skin:     General: Skin is warm and dry  Capillary Refill: Capillary refill takes less than 2 seconds  Findings: No bruising, erythema or rash  Neurological:      General: No focal deficit present  Mental Status: She is alert and oriented to person, place, and time  GCS: GCS eye subscore is 4  GCS verbal subscore is 5  GCS motor subscore is 6  Cranial Nerves: Cranial nerves are intact  Sensory: Sensation is intact  No sensory deficit  Motor: Motor function is intact  Gait: Gait is intact  Deep Tendon Reflexes:      Reflex Scores:       Patellar reflexes are 2+ on the right side and 2+ on the left side  Psychiatric:         Mood and Affect: Mood is anxious  Affect is tearful           Vital Signs  ED Triage Vitals [10/01/22 2235]   Temperature Pulse Respirations Blood Pressure SpO2   98 1 °F (36 7 °C) 63 18 139/91 99 %      Temp Source Heart Rate Source Patient Position - Orthostatic VS BP Location FiO2 (%)   Oral Monitor Sitting Right arm --      Pain Score       --           Vitals:    10/01/22 2235   BP: 139/91   Pulse: 63   Patient Position - Orthostatic VS: Sitting         Visual Acuity      ED Medications  Medications   ketorolac (TORADOL) injection 15 mg (has no administration in time range)       Diagnostic Studies  Results Reviewed     Procedure Component Value Units Date/Time    CBC and differential [[id number]]     Lab Status: No result Specimen: Blood     Comprehensive metabolic panel [[id number]]     Lab Status: No result Specimen: Blood     D-Dimer [[id number]]     Lab Status: No result Specimen: Blood     Lipase [[id number]]     Lab Status: No result Specimen: Blood     HS Troponin 0hr (reflex protocol) [[id number]]     Lab Status: No result Specimen: Blood                  No orders to display              Procedures  Procedures         ED Course  ED Course as of 10/01/22 2315   Sat Oct 01, 2022   2314 Patient signed out to Dr Michael Vera pending work up                               SBIRT 22yo+    [employer] Most Recent Value   SBIRT (23 yo +)    In order to provide better care to our patients, we are screening all of our patients for alcohol and drug use  Would it be okay to ask you these screening questions? No Filed at: 10/01/2022 2237                    MDM    Disposition  Final diagnoses:   None     ED Disposition     None      Follow-up Information    None         Patient's Medications    No medications on file       No discharge procedures on file      PDMP Review     None          ED Provider  Electronically Signed by           Trung Albarran PA-C  10/01/22 2315

## 2022-10-02 NOTE — ANESTHESIA PREPROCEDURE EVALUATION
Procedure:  CHOLECYSTECTOMY LAPAROSCOPIC (N/A Abdomen)    Relevant Problems   No relevant active problems      Asthma - last used rescue inhaler 2 months ago, last ED visit for resp distress >10years ago  Migraines  Obesity  Smoker - vapes everyday    Factor V leiden mutation - had a DVT with her first pregnancy 11 years ago  Was on warfarin for a limited amount of time  Not currently on [facility]  No hx of PE  No further DVTs    Lab Results   Component Value Date    WBC 9 24 10/01/2022    HGB 13 3 10/01/2022    HCT 41 3 10/01/2022    MCV 86 10/01/2022     10/01/2022     Lab Results   Component Value Date    SODIUM 138 10/01/2022    K 4 0 10/01/2022     10/01/2022    CO2 28 10/01/2022    BUN 17 10/01/2022    CREATININE 1 14 10/01/2022    GLUC 112 10/01/2022    CALCIUM 8 8 10/01/2022           Physical Exam    Airway    Mallampati score: III  TM Distance: <3 FB  Neck ROM: limited     Dental   Comment: Poor dentition  Multiple decaying teeth at top  ,     Cardiovascular  Rhythm: regular, Rate: normal,     Pulmonary  Breath sounds clear to auscultation,     Other Findings        Anesthesia Plan  ASA Score- 3 Emergent    Anesthesia Type- general with ASA Monitors  Additional Monitors:   Airway Plan: ETT  Comment: Risks/benefits and alternatives discussed with patient including possible PONV, sore throat, damage to teeth/lips/gums, and possibility of rare anesthetic and surgical emergencies including but not limited to heart attack, stroke, and/or death  All questions were answered          Plan Factors-    Chart reviewed  Existing labs reviewed  Patient summary reviewed  Patient is a current smoker  Patient smoked on day of surgery  Obstructive sleep apnea risk education given perioperatively  Induction- intravenous  Postoperative Plan- Plan for postoperative opioid use  Planned trial extubation    Informed Consent- Anesthetic plan and risks discussed with patient    I personally reviewed this patient with the CRNA  Discussed and agreed on the Anesthesia Plan with the ANTONIO Ramírez

## 2022-10-02 NOTE — OP NOTE
OPERATIVE REPORT  PATIENT NAME: Lida Ormond    :  1990  MRN: [id number]  Pt Location: [facility] OR ROOM 02    SURGERY DATE: 10/2/2022    Surgeon(s) and Role:     * Kortney Urena DO - Primary     * Penelope Max PA-C - Assisting    Preop Diagnosis:  Acute cholecystitis [K81 0]    Post-Op Diagnosis Codes:     * Acute cholecystitis [K81 0]  With cholelithiasis    Procedure(s) (LRB):  CHOLECYSTECTOMY LAPAROSCOPIC (N/A)    Specimen(s):  ID Type Source Tests Collected by Time Destination   1 : GALLBLADDER Tissue Gallbladder TISSUE EXAM Kortney Urena  10/2/2022 10:41 AM        Estimated Blood Loss:   25 mL    Drains:  NG/OG/Enteral Tube Orogastric 18 Fr Center mouth (Active)   Number of days: 0       Anesthesia Type:   General    Operative Indications:  Acute cholecystitis [K81 0] with cholelithiasis      Operative Findings: Thick-walled edematous gallbladder with large at least 2 cm stone in the neck of the gallbladder  Complications:   None    Procedure and Technique:  The patient is identified and taken to the operating room  Time-out performed with all members of the team present and the patient was placed under general endotracheal anesthesia  The patient was prepped and draped in a sterile manner using ChloraPrep to the abdomen  1% lidocaine with epinephrine was instilled at the umbilical area and incision was made and the Veress needle was inserted into the peritoneal cavity  The peritoneal cavity was insufflated with CO2 and after sufficient insufflation the Veress needle was removed and a 5 mm trocar was placed  Under direct vision a 11 mm trocar was placed at the mid epigastric area and then a 5 mm trocar was placed the right upper quadrant and a 5 mm trocars placed at the mid right lateral abdomen  The patient was then placed in reverse Trendelenburg and tilted to her left  The gallbladder was grasped at the fundus assistant and I grasped the gallbladder at the neck    I was able to milk the stone into the body of the gallbladder and then I was able to grasp the gallbladder at the Yris's pouch area  I slowly meticulously dissected the cystic duct the cystic artery free from the adjacent tissue was able placed 2 proximal 1 distal clip on the cystic duct and transected between I then placed 2 proximal 1 distal clip on the cystic artery transected between  The gallbladder was removed from the liver bed using cautery  Cautery was used for hemostasis  The area was irrigated we suction out the irrigant  A Surgicel was then placed in liver bed and then the gallbladder was placed in endobag removed from the peritoneal cavity via the xiphoid port  All the trocars were removed and the fascia as 11 mm trocar site was closed with interrupted figure-of-eight 0 Vicryl suture  The skin was closed with subcutaneous 4-0 Vicryl suture and skin glue was used for dressing  The patient tolerated the procedure well the sponge needle sharp instrument counts were correct x2 and the EBL was minimal     Patient was extubated transferred to recovery room in satisfactory and stable condition  A physician assistant was required for assistance and retraction of tissue   I was present for the entire procedure      Patient Disposition:  PACU         SIGNATURE: Nereida Salinas DO  DATE: October 2, 2022  TIME: 11:14 AM

## 2022-10-02 NOTE — ED NOTES
All medications given by this RN  Gail Hernandez, [facility]  10/01/22 [id number]

## 2022-10-02 NOTE — ASSESSMENT & PLAN NOTE
· Presents with 10/10 severe epigastric abdominal pain radiating through to back with associated nausea  · Reports similar previous episodes that were nonsustained  · CT abdomen pelvis revealed cholelithiasis in gallbladder neck with associated cholecystitis  · Check RUQ US  · No leukocytosis or LFT abnormality  · NPO  · IV hydration  · Empiric Zosyn  · Pain control  · GI prophylaxis-PPI  · DVT prophylaxis-SCDs  · General surgery consultation appreciated

## 2022-10-02 NOTE — PLAN OF CARE
Problem: PAIN - ADULT  Goal: Verbalizes/displays adequate comfort level or baseline comfort level  Description: Interventions:  - Encourage patient to monitor pain and request assistance  - Assess pain using appropriate pain scale  - Administer analgesics based on type and severity of pain and evaluate response  - Implement non-pharmacological measures as appropriate and evaluate response  - Consider cultural and social influences on pain and pain management  - Notify physician/advanced practitioner if interventions unsuccessful or patient reports new pain  Outcome: Progressing     Problem: INFECTION - ADULT  Goal: Absence or prevention of progression during hospitalization  Description: INTERVENTIONS:  - Assess and monitor for signs and symptoms of infection  - Monitor lab/diagnostic results  - Monitor all insertion sites, i e  indwelling lines, tubes, and drains  - Monitor endotracheal if appropriate and nasal secretions for changes in amount and color  - [place] appropriate cooling/warming therapies per order  - Administer medications as ordered  - Instruct and encourage patient and family to use good hand hygiene technique  - Identify and instruct in appropriate isolation precautions for identified infection/condition  Outcome: Progressing     Problem: DISCHARGE PLANNING  Goal: Discharge to home or other facility with appropriate resources  Description: INTERVENTIONS:  - Identify barriers to discharge w/patient and caregiver  - Arrange for needed discharge resources and transportation as appropriate  - Identify discharge learning needs (meds, wound care, etc )  - Arrange for interpretive services to assist at discharge as needed  - Refer to Case Management Department for coordinating discharge planning if the patient needs post-hospital services based on physician/advanced practitioner order or complex needs related to functional status, cognitive ability, or social support system  Outcome: Progressing Problem: Knowledge Deficit  Goal: Patient/family/caregiver demonstrates understanding of disease process, treatment plan, medications, and discharge instructions  Description: Complete learning assessment and assess knowledge base    Interventions:  - Provide teaching at level of understanding  - Provide teaching via preferred learning methods  Outcome: Progressing

## 2022-10-02 NOTE — ED PROCEDURE NOTE
PROCEDURE  ECG 12 Lead Documentation Only    Date/Time: 10/1/2022 11:51 PM  Performed by: Caitie Caraballo DO  Authorized by: Caitie Caraballo DO     Indications / Diagnosis:  Abdominal pain  ECG reviewed by me, the ED Provider: yes    Patient location:  ED  Previous ECG:     Previous ECG:  Unavailable  Interpretation:     Interpretation: normal    Rate:     ECG rate assessment: normal    Rhythm:     Rhythm: sinus rhythm    Ectopy:     Ectopy: none    QRS:     QRS axis:  Normal  Conduction:     Conduction: normal    ST segments:     ST segments:  Normal  T waves:     T waves: normal           Caitie Caraballo DO  10/01/22 2358

## 2022-10-03 VITALS
DIASTOLIC BLOOD PRESSURE: 63 MMHG | TEMPERATURE: 97.7 F | HEART RATE: 54 BPM | OXYGEN SATURATION: 96 % | WEIGHT: 292 LBS | RESPIRATION RATE: 19 BRPM | SYSTOLIC BLOOD PRESSURE: 111 MMHG | HEIGHT: 66 IN | BODY MASS INDEX: 46.93 KG/M2

## 2022-10-03 PROCEDURE — 99239 HOSP IP/OBS DSCHRG MGMT >30: CPT | Performed by: FAMILY MEDICINE

## 2022-10-03 RX ORDER — KETOROLAC TROMETHAMINE 10 MG/1
10 TABLET, FILM COATED ORAL EVERY 6 HOURS PRN
Qty: 10 TABLET | Refills: 0 | Status: SHIPPED | OUTPATIENT
Start: 2022-10-03

## 2022-10-03 RX ORDER — ACETAMINOPHEN 500 MG
500 TABLET ORAL EVERY 6 HOURS PRN
Qty: 30 TABLET | Refills: 0 | Status: SHIPPED | OUTPATIENT
Start: 2022-10-03

## 2022-10-03 RX ADMIN — HEPARIN SODIUM 5000 UNITS: 5000 INJECTION INTRAVENOUS; SUBCUTANEOUS at 06:21

## 2022-10-03 RX ADMIN — SODIUM CHLORIDE 100 ML/HR: 0.9 INJECTION, SOLUTION INTRAVENOUS at 00:23

## 2022-10-03 RX ADMIN — PIPERACILLIN AND TAZOBACTAM 4.5 G: 4; .5 INJECTION, POWDER, LYOPHILIZED, FOR SOLUTION INTRAVENOUS at 01:35

## 2022-10-03 NOTE — PROGRESS NOTES
Progress Note - General Surgery   [place] 28 y o  female MRN: [id number]  Unit/Bed#: -[facility] Encounter: [id number]    Assessment:  POD1 s/p laparoscopic cholecystectomy  Afebrile, VSS  Passing gas  Pain well controlled  Voiding without issue    Plan:  Diet as tolerated  May shower  Discharge today from surgical perspective  Motrin PRN pain  No lifting more than 10 pounds for 2 weeks  May shower, do not submerge underwater (tub, pool etc) for 2 weeks  F/U in clinic in 2 weeks with Ni Ybarra PAC    Subjective/Objective     Subjective: No acute events  Objective:   Blood pressure 114/54, pulse 70, temperature (!) 97 °F (36 1 °C), resp  rate 17, height 5' 6" (1 676 m), weight 132 kg (292 lb), last menstrual period 09/01/2022, SpO2 96 %  ,Body mass index is 47 13 kg/m²  Intake/Output Summary (Last 24 hours) at 10/3/2022 0736  Last data filed at 10/3/2022 0027  Gross per 24 hour   Intake 1844 ml   Output 50 ml   Net 1794 ml       Invasive Devices  Report    Peripheral Intravenous Line  Duration           Peripheral IV 10/01/22 Left Antecubital 1 day                Physical Exam:  Gen: AxOx3  Abd: soft, bowel sounds present, incisions are C/D/I  Appropriate incisional tenderness  Non distended  Lab, Imaging and other studies:I have personally reviewed pertinent lab results    , CBC: No results found for: WBC, HGB, HCT, MCV, PLT, ADJUSTEDWBC, MCH, MCHC, RDW, MPV, NRBC, CMP: No results found for: SODIUM, K, CL, CO2, ANIONGAP, BUN, CREATININE, GLUCOSE, CALCIUM, AST, ALT, ALKPHOS, PROT, BILITOT, EGFR     VTE Pharmacologic Prophylaxis: Heparin  VTE Mechanical Prophylaxis: sequential compression device     [facility]

## 2022-10-03 NOTE — PLAN OF CARE
Problem: PAIN - ADULT  Goal: Verbalizes/displays adequate comfort level or baseline comfort level  Description: Interventions:  - Encourage patient to monitor pain and request assistance  - Assess pain using appropriate pain scale  - Administer analgesics based on type and severity of pain and evaluate response  - Implement non-pharmacological measures as appropriate and evaluate response  - Consider cultural and social influences on pain and pain management  - Notify physician/advanced practitioner if interventions unsuccessful or patient reports new pain  Outcome: Progressing     Problem: INFECTION - ADULT  Goal: Absence or prevention of progression during hospitalization  Description: INTERVENTIONS:  - Assess and monitor for signs and symptoms of infection  - Monitor lab/diagnostic results  - Monitor all insertion sites, i e  indwelling lines, tubes, and drains  - Monitor endotracheal if appropriate and nasal secretions for changes in amount and color  - [place] appropriate cooling/warming therapies per order  - Administer medications as ordered  - Instruct and encourage patient and family to use good hand hygiene technique  - Identify and instruct in appropriate isolation precautions for identified infection/condition  Outcome: Progressing     Problem: DISCHARGE PLANNING  Goal: Discharge to home or other facility with appropriate resources  Description: INTERVENTIONS:  - Identify barriers to discharge w/patient and caregiver  - Arrange for needed discharge resources and transportation as appropriate  - Identify discharge learning needs (meds, wound care, etc )  - Arrange for interpretive services to assist at discharge as needed  - Refer to Case Management Department for coordinating discharge planning if the patient needs post-hospital services based on physician/advanced practitioner order or complex needs related to functional status, cognitive ability, or social support system  Outcome: Progressing Problem: Knowledge Deficit  Goal: Patient/family/caregiver demonstrates understanding of disease process, treatment plan, medications, and discharge instructions  Description: Complete learning assessment and assess knowledge base    Interventions:  - Provide teaching at level of understanding  - Provide teaching via preferred learning methods  Outcome: Progressing

## 2022-10-03 NOTE — DISCHARGE SUMMARY
[address]  Discharge- Juan Bell 1990, 28 y o  female MRN: [id number]  Unit/Bed#: -01 Encounter: [id number]  Primary Care Provider: No primary care provider on file  Date and time admitted to hospital: 10/1/2022 10:35 PM    * Calculus of gallbladder with acute cholecystitis without obstruction  Assessment & Plan  · Presents with 10/10 severe epigastric abdominal pain radiating through to back with associated nausea  · Reports similar previous episodes that were nonsustained  · CT abdomen pelvis revealed cholelithiasis in gallbladder neck with associated cholecystitis  · Status post laparoscopic cholecystectomy, pod 1  · Patient is tolerating well, surgery evaluation done, cleared the patient  Patient be discharged back home with follow-up with surgery in 1-2 weeks  · No significant or heavy weight lifting for 1-2 weeks    Tobacco abuse  Assessment & Plan  · Daily nicotine vaping  · Declines nicotine replacement therapy  · Tobacco cessation discussed    Morbid obesity with BMI of 45 0-49 9, adult (HCC)  Assessment & Plan  · BMI 47  · Requires intensive lifestyle modification        Medical Problems             Resolved Problems  Date Reviewed: 10/2/2022   None               Discharging Physician / Practitioner: Thao Arrieta  PCP: No primary care provider on file  Admission Date:   Admission Orders (From admission, onward)     Ordered        10/02/22 0123  INPATIENT ADMISSION  Once                      Discharge Date: 10/03/22    Consultations During Hospital Stay:  · General surgery    Procedures Performed:   PE Study with CT abdomen & pelvis with contrast    Result Date: 10/2/2022  Narrative: CT PULMONARY ANGIOGRAM OF THE CHEST AND CT ABDOMEN AND PELVIS WITH INTRAVENOUS CONTRAST INDICATION:   Patient with back pain elevated D-dimer and history of hypercoagulable state  Also with right upper quadrant abdominal pain    COMPARISON:  None   TECHNIQUE:  CT examination of the chest, abdomen and pelvis was performed  Thin section CT angiographic technique was used in the chest in order to evaluate for pulmonary embolus and coronal 3D MIP postprocessing was performed on the acquisition scanner  Axial, sagittal, and coronal 2D reformatted images were created from the source data and submitted for interpretation  Radiation dose length product (DLP) for this visit:  2254 mGy-cm   This examination, like all CT scans performed in the [facility], was performed utilizing techniques to minimize radiation dose exposure, including the use of iterative reconstruction and automated exposure control  IV Contrast:  99 mL of iohexol (OMNIPAQUE) Enteric Contrast:  Enteric contrast was not administered  FINDINGS: CHEST PULMONARY ARTERIAL TREE:  No pulmonary embolus is seen  LUNGS:  Lungs are clear  There is no tracheal or endobronchial lesion  PLEURA:  Unremarkable  HEART/AORTA:  Heart is unremarkable for patient's age  No thoracic aortic aneurysm  MEDIASTINUM AND JOSIE:  Unremarkable  CHEST WALL AND LOWER NECK:  Unremarkable  ABDOMEN LIVER/BILIARY TREE:  Unremarkable  GALLBLADDER:  Gallstones are noted  There is pericholecystic fluid  SPLEEN:  Unremarkable  PANCREAS:  Unremarkable  ADRENAL GLANDS:  Unremarkable  KIDNEYS/URETERS:  Unremarkable  No hydronephrosis  STOMACH AND BOWEL:  Unremarkable  APPENDIX:  Base of the appendix is normal  Midportion and tip are not visualized    ABDOMINOPELVIC CAVITY:  No ascites  No pneumoperitoneum  No lymphadenopathy  VESSELS:  Unremarkable for patient's age  PELVIS REPRODUCTIVE ORGANS:  Low-lying IUD  Recommend repositioning  Otherwise unremarkable for patient's age  URINARY BLADDER:  Unremarkable  ABDOMINAL WALL/INGUINAL REGIONS:  Suprapubic fat and bowel-containing hernia without evidence for incarceration or obstruction  Small umbilical hernia is also noted containing fat    OSSEOUS STRUCTURES:  No acute fracture or destructive osseous lesion  Impression: There are gallstones and a gallstone is seen in the gallbladder neck  There is pericholecystic fluid  Findings suggests acute cholecystitis  Recommend clinical correlation  Low-lying IUD  Recommend repositioning  The study was marked in [facility] for immediate notification  Workstation performed: [id number]     US right upper quadrant    Result Date: 10/2/2022  Narrative: RIGHT UPPER QUADRANT ULTRASOUND INDICATION:     Evaluate for cholecystitis  COMPARISON:  CTA chest CT abdomen pelvis with contrast October 2, 2022  TECHNIQUE:   Real-time ultrasound of the right upper quadrant was performed with a curvilinear transducer with both volumetric sweeps and still imaging techniques  FINDINGS: PANCREAS:  Visualized portions of the pancreas are within normal limits  AORTA AND IVC:  Visualized portions are normal for patient age  LIVER: Size:  Within normal range  The liver measures 17 3 cm in the midclavicular line  Contour:  Surface contour is smooth  Parenchyma:  Echogenicity and echotexture are within normal limits  No liver mass identified  Limited imaging of the main portal vein shows it to be patent and hepatopetal   BILIARY: The gallbladder is normal in caliber  Gallbladder wall thickening measuring 7 mm  No pericholecystic fluid  Shadowing gallstones are present in the gallbladder body (16 mm x 20 mm x 6 mm) and neck (11 mm x 9 mm x 5 mm)  A positive sonographic Willson's sign was elicited  No intrahepatic biliary dilatation  CBD measures 6 0 mm  No choledocholithiasis  KIDNEY: Right kidney measures 10 2 x 4 0 x 4 4 cm  Volume 92 9 mL Kidney within normal limits  ASCITES:   None  · Impression: Findings suggestive of acute cholecystitis  The study was marked in [facility] for immediate notification   Workstation performed: [id number]     Status post laparoscopic cholecystectomy    Significant Findings / Test Results:   Lab Results   Component Value Date    WBC 9 24 10/01/2022    HGB 13 3 10/01/2022 HCT 41 3 10/01/2022    MCV 86 10/01/2022     10/01/2022   ·   Lab Results   Component Value Date    SODIUM 138 10/01/2022    K 4 0 10/01/2022     10/01/2022    CO2 28 10/01/2022    AGAP 7 10/01/2022    BUN 17 10/01/2022    CREATININE 1 14 10/01/2022    GLUC 112 10/01/2022    CALCIUM 8 8 10/01/2022    AST 16 10/01/2022    ALT 24 10/01/2022    ALKPHOS 88 10/01/2022    TP 7 6 10/01/2022    TBILI 0 27 10/01/2022    EGFR 63 10/01/2022   ·   ·     Incidental Findings:   · As mentioned above     Test Results Pending at Discharge (will require follow up): · Pending biopsy report     Outpatient Tests Requested:  · None    Complications:  None    Reason for Admission:  Abdominal pain    Hospital Course:   Frieda Seen is a 28 y o  female patient who originally presented to the hospital on 10/1/2022 due to abdominal pain secondary to acute cholecystitis  Patient is status post laparoscopic cholecystectomy on pod 1 with General surgery  General surgery recommendation appreciated  Patient is tolerating well on postop  Tolerating diet  Patient be discharged back home with follow-up with PCP and General surgery in 1-2 weeks  Patient advised not to lift heavy weight for at least 1-2 weeks or till cleared by PCP or General surgery  All those lab results, imaging findings, treatment plan of care discussed in details with patient  Patient verbalizes to understand and agrees  Please see above list of diagnoses and related plan for additional information  Condition at Discharge: stable    Discharge Day Visit / Exam:   Subjective:  Seen and evaluated during the round  Resting comfortably  Denies any significant complaint other than tiredness    Vitals: Blood Pressure: 111/63 (10/03/22 0741)  Pulse: (!) 54 (10/03/22 0741)  Temperature: 97 7 °F (36 5 °C) (10/03/22 0741)  Temp Source: Oral (10/01/22 2235)  Respirations: 19 (10/03/22 0741)  Height: 5' 6" (167 6 cm) (10/01/22 2235)  Weight - Scale: 132 kg (292 lb) (10/01/22 2235)  SpO2: 100 % (10/03/22 0741)  Exam:   Physical Exam  Vitals and nursing note reviewed  Constitutional:       Appearance: Normal appearance  She is obese  She is not ill-appearing or diaphoretic  HENT:      Right Ear: Tympanic membrane normal       Nose: Nose normal  No congestion  Mouth/Throat:      Mouth: Mucous membranes are moist       Pharynx: Oropharynx is clear  No oropharyngeal exudate  Eyes:      General: No scleral icterus  Left eye: No discharge  Extraocular Movements: Extraocular movements intact  Conjunctiva/sclera: Conjunctivae normal       Pupils: Pupils are equal, round, and reactive to light  Neck:      Vascular: No carotid bruit  Cardiovascular:      Rate and Rhythm: Normal rate  Heart sounds: Normal heart sounds  No murmur heard  No friction rub  No gallop  Pulmonary:      Effort: Pulmonary effort is normal  No respiratory distress  Breath sounds: No stridor  No wheezing or rhonchi  Abdominal:      General: Abdomen is flat  Bowel sounds are normal  There is no distension  Palpations: There is no mass  Tenderness: There is no abdominal tenderness  Hernia: No hernia is present  Comments: Surgical sites looks in healthy   Musculoskeletal:         General: No swelling, tenderness or signs of injury  Normal range of motion  Cervical back: Normal range of motion  No rigidity or tenderness  Lymphadenopathy:      Cervical: No cervical adenopathy  Skin:     General: Skin is warm  Coloration: Skin is not jaundiced or pale  Findings: No bruising or erythema  Neurological:      General: No focal deficit present  Mental Status: She is alert and oriented to person, place, and time  Cranial Nerves: No cranial nerve deficit  Sensory: No sensory deficit  Motor: No weakness        Coordination: Coordination normal    Psychiatric:         Mood and Affect: Mood normal  Discussion with Family: With patient  Discharge instructions/Information to patient and family:   See after visit summary for information provided to patient and family  Provisions for Follow-Up Care:  See after visit summary for information related to follow-up care and any pertinent home health orders  Disposition:   Home    Planned Readmission: To monitor above condition     Discharge Statement:  I spent >35 minutes discharging the patient  This time was spent on the day of discharge  I had direct contact with the patient on the day of discharge  Greater than 50% of the total time was spent examining patient, answering all patient questions, arranging and discussing plan of care with patient as well as directly providing post-discharge instructions  Additional time then spent on discharge activities  Discharge Medications:  See after visit summary for reconciled discharge medications provided to patient and/or family        **Please Note: This note may have been constructed using a voice recognition system**

## 2022-10-03 NOTE — ASSESSMENT & PLAN NOTE
· Presents with 10/10 severe epigastric abdominal pain radiating through to back with associated nausea  · Reports similar previous episodes that were nonsustained  · CT abdomen pelvis revealed cholelithiasis in gallbladder neck with associated cholecystitis  · Status post laparoscopic cholecystectomy, pod 1  · Patient is tolerating well, surgery evaluation done, cleared the patient  Patient be discharged back home with follow-up with surgery in 1-2 weeks    · No significant or heavy weight lifting for 1-2 weeks

## 2022-10-03 NOTE — PLAN OF CARE
Problem: PAIN - ADULT  Goal: Verbalizes/displays adequate comfort level or baseline comfort level  Description: Interventions:  - Encourage patient to monitor pain and request assistance  - Assess pain using appropriate pain scale  - Administer analgesics based on type and severity of pain and evaluate response  - Implement non-pharmacological measures as appropriate and evaluate response  - Consider cultural and social influences on pain and pain management  - Notify physician/advanced practitioner if interventions unsuccessful or patient reports new pain  10/3/2022 1125 by Flower Maravilla RN  Outcome: Adequate for Discharge  10/3/2022 1125 by Flower Maravilla RN  Outcome: Progressing     Problem: INFECTION - ADULT  Goal: Absence or prevention of progression during hospitalization  Description: INTERVENTIONS:  - Assess and monitor for signs and symptoms of infection  - Monitor lab/diagnostic results  - Monitor all insertion sites, i e  indwelling lines, tubes, and drains  - Monitor endotracheal if appropriate and nasal secretions for changes in amount and color  - [place] appropriate cooling/warming therapies per order  - Administer medications as ordered  - Instruct and encourage patient and family to use good hand hygiene technique  - Identify and instruct in appropriate isolation precautions for identified infection/condition  10/3/2022 1125 by Flower Maravilla RN  Outcome: Adequate for Discharge  10/3/2022 1125 by Flower Maravilla RN  Outcome: Progressing     Problem: DISCHARGE PLANNING  Goal: Discharge to home or other facility with appropriate resources  Description: INTERVENTIONS:  - Identify barriers to discharge w/patient and caregiver  - Arrange for needed discharge resources and transportation as appropriate  - Identify discharge learning needs (meds, wound care, etc )  - Arrange for interpretive services to assist at discharge as needed  - Refer to Case Management Department for coordinating discharge planning if the patient needs post-hospital services based on physician/advanced practitioner order or complex needs related to functional status, cognitive ability, or social support system  10/3/2022 1125 by Amari Owen RN  Outcome: Adequate for Discharge  10/3/2022 1125 by Amari Owen RN  Outcome: Progressing     Problem: Knowledge Deficit  Goal: Patient/family/caregiver demonstrates understanding of disease process, treatment plan, medications, and discharge instructions  Description: Complete learning assessment and assess knowledge base    Interventions:  - Provide teaching at level of understanding  - Provide teaching via preferred learning methods  10/3/2022 1125 by Amari Owen RN  Outcome: Adequate for Discharge  10/3/2022 1125 by Amari Owen RN  Outcome: Progressing

## 2022-10-03 NOTE — DISCHARGE INSTRUCTIONS
Laparoscopic Cholecystectomy   WHAT YOU NEED TO KNOW:   Laparoscopic cholecystectomy is surgery to remove your gallbladder  DISCHARGE INSTRUCTIONS:   Call Dr Lincoln Wyatt office at [phone] with any questions or concerns    Medicines: You may need any of the following:  Prescription pain medicine - Take as directed    You may also take tylenol as needed    Take your medicine as directed  Contact your healthcare provider if you think your medicine is not helping or if you have side effects  Tell her if you are allergic to any medicine  Keep a list of the medicines, vitamins, and herbs you take  Include the amounts, and when and why you take them  Bring the list or the pill bottles to follow-up visits  Carry your medicine list with you in case of an emergency  Follow up with your healthcare provider 2 weeks after surgery, or as directed:  Write down your questions so you remember to ask them during your visits  Wound care: You may shower and pat the incisions dry  Do not soak underwater for 2 weeks   What to eat after surgery: You may eat whatever you feel up to following surgery  You may notice diarrhea with fatty foods  Drink plenty of liquids  When to return to work and other activities:  No lifting, pushing, or pulling greater then 10lb for 2 weeks  Walk as much as possible  YOU may drive when you are comfortable enough to turn and press the brake without pain medication    Contact your healthcare provider if:   You have a fever over 101°F (38°C) or chills  You have pain or nausea that is not relieved by medicine  You have redness and swelling around your incisions, or blood or pus is leaking from your incisions  You are constipated or have diarrhea  Your skin or eyes are yellow, or your bowel movements are pale  You have questions or concerns about your surgery, condition, or care  Seek care immediately or call 911 if:   You cannot stop vomiting       Your bowel movements are black or bloody  You have pain in your abdomen and it is swollen or hard  Your arm or leg feels warm, tender, and painful  It may look swollen and red  You feel lightheaded, short of breath, and have chest pain  You cough up blood  © 2017 [facility] Information is for End User's use only and may not be sold, redistributed or otherwise used for commercial purposes  All illustrations and images included in CareNotes® are the copyrighted property of A D A M , Inc  or [employer] [facility]  The above information is an  only  It is not intended as medical advice for individual conditions or treatments  Talk to your doctor, nurse or pharmacist before following any medical regimen to see if it is safe and effective for you

## 2022-10-03 NOTE — INCIDENTAL FINDINGS
The following findings require follow up:  Radiographic finding   Finding:  PE Study with CT abdomen & pelvis with contrast: There are gallstones and a gallstone is seen in the gallbladder neck  There is pericholecystic fluid  Findings suggests acute cholecystitis  Recommend clinical correlation  , Low-lying IUD  Recommend repositioning   , The study was marked in EPIC for immediate notification  , Workstation performed: [id number]  [address] right upper quadrant: Findings suggestive of acute cholecystitis  , The study was marked in [facility] for immediate notification  , Workstation performed: [id number]     Follow up required: yes   Follow up should be done within 1 week(s)    Please notify the following clinician to assist with the follow up:   GABY Carvalho PA-CPhysician [address] [facility] [address]  [facility]  Primary [address] [place] [phone]

## 2022-10-04 NOTE — UTILIZATION REVIEW
Notification of Discharge   This is a Notification of Discharge from our facility [address]  Please be advised that this patient has been discharge from our facility  Below you will find the admission and discharge date and time including the patients disposition  UTILIZATION REVIEW CONTACT:  [address]  Utilization   Network Utilization Review Department  Phone: [phone] x carefully listen to the prompts  All voicemails are confidential   Email: [email or web address]     PHYSICIAN ADVISORY SERVICES:  FOR IMLF-BL-NHCS REVIEW - MEDICAL NECESSITY DENIAL  Phone: [phone]  Fax: [phone]  Email: [email or web address]     PRESENTATION DATE: 10/1/2022 10:35 PM  OBERVATION ADMISSION DATE:   INPATIENT ADMISSION DATE: 10/2/22  1:23 AM   DISCHARGE DATE: 10/3/2022  1:10 PM  DISPOSITION: Home/Self Care Home/Self Care      IMPORTANT INFORMATION:  Send all requests for admission clinical reviews, approved or denied determinations and any other requests to dedicated fax number below belonging to the campus where the patient is receiving treatment   List of dedicated fax numbers:  [address] DENIALS (Administrative/Medical Necessity) [phone]   [address] (Maternity/NICU/Pediatrics) [phone]   [facility] [phone]   [address] [facility] [phone]   [facility]  [phone]   [address] [place] [address] [address] [place] [address],[address] [phone]   [facility]  [phone]   [address] [place] [address]  [address] [facility] [address] [facility] [phone]

## 2022-10-11 PROCEDURE — 88304 TISSUE EXAM BY PATHOLOGIST: CPT | Performed by: PATHOLOGY

## (undated) DEVICE — LAPAROSCOPIC SMOKE EVAC TUBING

## (undated) DEVICE — TROCAR: Brand: KII SLEEVE

## (undated) DEVICE — TOWEL SURG XR DETECT GREEN STRL RFD

## (undated) DEVICE — PAD GROUNDING ADULT

## (undated) DEVICE — SYRINGE 10ML LL

## (undated) DEVICE — VIAL DECANTER

## (undated) DEVICE — SURGICEL 2 X 3

## (undated) DEVICE — SCD SEQUENTIAL COMPRESSION COMFORT SLEEVE LARGE KNEE LENGTH: Brand: KENDALL SCD

## (undated) DEVICE — CHLORAPREP HI-LITE 26ML ORANGE

## (undated) DEVICE — TUBING INSUFFLATION SET ISO CONNECTOR

## (undated) DEVICE — ELECTRODE LAP L WIRE E-Z CLEAN 33CM -0100

## (undated) DEVICE — SUT VICRYL 0 UR-6 27 IN J603H

## (undated) DEVICE — ALLENTOWN LAP CHOLE APP PACK: Brand: CARDINAL HEALTH

## (undated) DEVICE — SURGICEL 4 X 8

## (undated) DEVICE — GLOVE INDICATOR PI UNDERGLOVE SZ 7 BLUE

## (undated) DEVICE — ENDOPOUCH RETRIEVER SPECIMEN RETRIEVAL BAGS: Brand: ENDOPOUCH RETRIEVER

## (undated) DEVICE — PENCIL ELECTROSURG E-Z CLEAN -0035H

## (undated) DEVICE — GLOVE SRG BIOGEL 7

## (undated) DEVICE — SUT VICRYL 4-0 PS-2 27 IN J426H

## (undated) DEVICE — TUBING SMOKE EVAC W/FILTRATION DEVICE PLUMEPORT ACTIV

## (undated) DEVICE — INTENDED FOR TISSUE SEPARATION, AND OTHER PROCEDURES THAT REQUIRE A SHARP SURGICAL BLADE TO PUNCTURE OR CUT.: Brand: BARD-PARKER SAFETY BLADES SIZE 11, STERILE

## (undated) DEVICE — LIGACLIP 10-M/L, 10MM ENDOSCOPIC ROTATING MULTIPLE CLIP APPLIERS: Brand: LIGACLIP

## (undated) DEVICE — VISUALIZATION SYSTEM: Brand: CLEARIFY

## (undated) DEVICE — DRAPE EQUIPMENT RF WAND

## (undated) DEVICE — ADHESIVE SKIN HIGH VISCOSITY EXOFIN 1ML

## (undated) DEVICE — DISPOSABLE OR TOWEL: Brand: CARDINAL HEALTH

## (undated) DEVICE — ENDOPATH PNEUMONEEDLE INSUFFLATION NEEDLES WITH LUER LOCK CONNECTORS 120MM: Brand: ENDOPATH

## (undated) DEVICE — TROCAR: Brand: KII FIOS FIRST ENTRY